# Patient Record
Sex: MALE | URBAN - METROPOLITAN AREA
[De-identification: names, ages, dates, MRNs, and addresses within clinical notes are randomized per-mention and may not be internally consistent; named-entity substitution may affect disease eponyms.]

---

## 2023-01-16 ENCOUNTER — APPOINTMENT (OUTPATIENT)
Dept: URBAN - METROPOLITAN AREA CLINIC 247 | Age: 58
Setting detail: DERMATOLOGY
End: 2023-01-16

## 2023-01-16 DIAGNOSIS — D18.0 HEMANGIOMA: ICD-10-CM

## 2023-01-16 DIAGNOSIS — B07.8 OTHER VIRAL WARTS: ICD-10-CM

## 2023-01-16 DIAGNOSIS — L57.8 OTHER SKIN CHANGES DUE TO CHRONIC EXPOSURE TO NONIONIZING RADIATION: ICD-10-CM

## 2023-01-16 DIAGNOSIS — L91.8 OTHER HYPERTROPHIC DISORDERS OF THE SKIN: ICD-10-CM

## 2023-01-16 DIAGNOSIS — L82.1 OTHER SEBORRHEIC KERATOSIS: ICD-10-CM

## 2023-01-16 DIAGNOSIS — D22 MELANOCYTIC NEVI: ICD-10-CM

## 2023-01-16 PROBLEM — D18.01 HEMANGIOMA OF SKIN AND SUBCUTANEOUS TISSUE: Status: ACTIVE | Noted: 2023-01-16

## 2023-01-16 PROBLEM — D23.71 OTHER BENIGN NEOPLASM OF SKIN OF RIGHT LOWER LIMB, INCLUDING HIP: Status: ACTIVE | Noted: 2023-01-16

## 2023-01-16 PROBLEM — D22.61 MELANOCYTIC NEVI OF RIGHT UPPER LIMB, INCLUDING SHOULDER: Status: ACTIVE | Noted: 2023-01-16

## 2023-01-16 PROCEDURE — OTHER LIQUID NITROGEN: OTHER

## 2023-01-16 PROCEDURE — 17110 DESTRUCT B9 LESION 1-14: CPT

## 2023-01-16 PROCEDURE — 99203 OFFICE O/P NEW LOW 30 MIN: CPT | Mod: 25

## 2023-01-16 PROCEDURE — OTHER COUNSELING: OTHER

## 2023-01-16 PROCEDURE — OTHER MIPS QUALITY: OTHER

## 2023-01-16 ASSESSMENT — LOCATION SIMPLE DESCRIPTION DERM
LOCATION SIMPLE: CHEST
LOCATION SIMPLE: LEFT CHEEK
LOCATION SIMPLE: LEFT ANTERIOR NECK
LOCATION SIMPLE: RIGHT MIDDLE FINGER
LOCATION SIMPLE: LEFT FOREHEAD
LOCATION SIMPLE: RIGHT UPPER ARM
LOCATION SIMPLE: LEFT UPPER BACK

## 2023-01-16 ASSESSMENT — LOCATION DETAILED DESCRIPTION DERM
LOCATION DETAILED: LEFT INFERIOR CENTRAL MALAR CHEEK
LOCATION DETAILED: RIGHT PROXIMAL POSTERIOR UPPER ARM
LOCATION DETAILED: RIGHT MEDIAL SUPERIOR CHEST
LOCATION DETAILED: LEFT MEDIAL UPPER BACK
LOCATION DETAILED: LEFT FOREHEAD
LOCATION DETAILED: LEFT MEDIAL INFERIOR CHEST
LOCATION DETAILED: LEFT SUPERIOR MEDIAL UPPER BACK
LOCATION DETAILED: LEFT INFERIOR MEDIAL MALAR CHEEK
LOCATION DETAILED: LEFT INFERIOR ANTERIOR NECK
LOCATION DETAILED: UPPER STERNUM
LOCATION DETAILED: RIGHT PROXIMAL DORSAL MIDDLE FINGER

## 2023-01-16 ASSESSMENT — LOCATION ZONE DERM
LOCATION ZONE: FACE
LOCATION ZONE: ARM
LOCATION ZONE: FINGER
LOCATION ZONE: TRUNK
LOCATION ZONE: NECK

## 2023-01-16 NOTE — PROCEDURE: LIQUID NITROGEN
Show Applicator Variable?: Yes
Post-Care Instructions: I reviewed with the patient in detail post-care instructions. Patient is to wear sunprotection, and avoid picking at any of the treated lesions. Pt may apply Vaseline to crusted or scabbing areas.
Medical Necessity Clause: This procedure was medically necessary because the lesions that were treated were:
Render Note In Bullet Format When Appropriate: No
Number Of Freeze-Thaw Cycles: 2 freeze-thaw cycles
Duration Of Freeze Thaw-Cycle (Seconds): 5-10
Medical Necessity Information: It is in your best interest to select a reason for this procedure from the list below. All of these items fulfill various CMS LCD requirements except the new and changing color options.
Application Tool (Optional): Cry-AC
Consent: The patient's consent was obtained including but not limited to risks of crusting, scabbing, blistering, scarring, darker or lighter pigmentary change, recurrence, incomplete removal and infection.
Spray Paint Text: The liquid nitrogen was applied to the skin utilizing a spray paint frosting technique.
Detail Level: Detailed

## 2023-07-05 ENCOUNTER — APPOINTMENT (OUTPATIENT)
Dept: URBAN - METROPOLITAN AREA CLINIC 247 | Age: 58
Setting detail: DERMATOLOGY
End: 2023-07-06

## 2023-07-05 DIAGNOSIS — L72.8 OTHER FOLLICULAR CYSTS OF THE SKIN AND SUBCUTANEOUS TISSUE: ICD-10-CM

## 2023-07-05 DIAGNOSIS — L82.0 INFLAMED SEBORRHEIC KERATOSIS: ICD-10-CM

## 2023-07-05 DIAGNOSIS — M71.3 OTHER BURSAL CYST: ICD-10-CM

## 2023-07-05 PROBLEM — D48.5 NEOPLASM OF UNCERTAIN BEHAVIOR OF SKIN: Status: ACTIVE | Noted: 2023-07-05

## 2023-07-05 PROCEDURE — 99212 OFFICE O/P EST SF 10 MIN: CPT | Mod: 25

## 2023-07-05 PROCEDURE — OTHER COUNSELING: OTHER

## 2023-07-05 PROCEDURE — 17110 DESTRUCT B9 LESION 1-14: CPT

## 2023-07-05 PROCEDURE — OTHER ADDITIONAL NOTES: OTHER

## 2023-07-05 PROCEDURE — OTHER LIQUID NITROGEN: OTHER

## 2023-07-05 PROCEDURE — OTHER ORDER ULTRASOUND: OTHER

## 2023-07-05 ASSESSMENT — LOCATION DETAILED DESCRIPTION DERM
LOCATION DETAILED: LEFT MEDIAL SUPERIOR CHEST
LOCATION DETAILED: LEFT CLAVICULAR NECK
LOCATION DETAILED: LEFT INFERIOR ANTERIOR NECK
LOCATION DETAILED: RIGHT CLAVICULAR NECK

## 2023-07-05 ASSESSMENT — LOCATION SIMPLE DESCRIPTION DERM
LOCATION SIMPLE: LEFT ANTERIOR NECK
LOCATION SIMPLE: CHEST
LOCATION SIMPLE: RIGHT ANTERIOR NECK

## 2023-07-05 ASSESSMENT — LOCATION ZONE DERM
LOCATION ZONE: TRUNK
LOCATION ZONE: NECK

## 2023-07-05 NOTE — PROCEDURE: LIQUID NITROGEN
Include Z78.9 (Other Specified Conditions Influencing Health Status) As An Associated Diagnosis?: No
Consent: The patient's consent was obtained including but not limited to risks of crusting, scabbing, blistering, scarring, darker or lighter pigmentary change, recurrence, incomplete removal and infection.
Medical Necessity Information: It is in your best interest to select a reason for this procedure from the list below. All of these items fulfill various CMS LCD requirements except the new and changing color options.
Spray Paint Text: The liquid nitrogen was applied to the skin utilizing a spray paint frosting technique.
Medical Necessity Clause: This procedure was medically necessary because the lesions that were treated were:
Show Aperture Variable?: Yes
Detail Level: Detailed
Post-Care Instructions: I reviewed with the patient in detail post-care instructions. Patient is to wear sunprotection, and avoid picking at any of the treated lesions. Pt may apply Vaseline to crusted or scabbing areas.

## 2023-07-05 NOTE — PROCEDURE: ORDER ULTRASOUND
Ultrasound Protocol: Ultrasound of Skin Lesion
Detail Level: Simple
Provider: Alba San MD
Skin Lesion Ultrasound Reason: Evaluate for cyst vs lipoma vs bursitis
Priority: normal
Lesion Location: right elbow

## 2023-07-05 NOTE — PROCEDURE: ADDITIONAL NOTES
Additional Notes: No h/o arthritis or pain\\nNo prior ocurrence\\n3.5cm.\\nPt advised to call us to inform us where he is got his ultrasound done at if he does hear from us a few days after his testing as we will call the facility to follow up and obtain results
Detail Level: Simple
Render Risk Assessment In Note?: no

## 2024-02-19 ENCOUNTER — APPOINTMENT (OUTPATIENT)
Dept: URBAN - METROPOLITAN AREA CLINIC 247 | Age: 59
Setting detail: DERMATOLOGY
End: 2024-02-19

## 2024-02-19 DIAGNOSIS — D49.2 NEOPLASM OF UNSPECIFIED BEHAVIOR OF BONE, SOFT TISSUE, AND SKIN: ICD-10-CM

## 2024-02-19 DIAGNOSIS — D18.0 HEMANGIOMA: ICD-10-CM

## 2024-02-19 DIAGNOSIS — L91.8 OTHER HYPERTROPHIC DISORDERS OF THE SKIN: ICD-10-CM

## 2024-02-19 DIAGNOSIS — L57.8 OTHER SKIN CHANGES DUE TO CHRONIC EXPOSURE TO NONIONIZING RADIATION: ICD-10-CM

## 2024-02-19 DIAGNOSIS — D22 MELANOCYTIC NEVI: ICD-10-CM

## 2024-02-19 DIAGNOSIS — L82.0 INFLAMED SEBORRHEIC KERATOSIS: ICD-10-CM

## 2024-02-19 DIAGNOSIS — B07.8 OTHER VIRAL WARTS: ICD-10-CM

## 2024-02-19 DIAGNOSIS — L82.1 OTHER SEBORRHEIC KERATOSIS: ICD-10-CM

## 2024-02-19 PROBLEM — D18.01 HEMANGIOMA OF SKIN AND SUBCUTANEOUS TISSUE: Status: ACTIVE | Noted: 2024-02-19

## 2024-02-19 PROBLEM — D23.71 OTHER BENIGN NEOPLASM OF SKIN OF RIGHT LOWER LIMB, INCLUDING HIP: Status: ACTIVE | Noted: 2024-02-19

## 2024-02-19 PROBLEM — D22.61 MELANOCYTIC NEVI OF RIGHT UPPER LIMB, INCLUDING SHOULDER: Status: ACTIVE | Noted: 2024-02-19

## 2024-02-19 PROCEDURE — 99213 OFFICE O/P EST LOW 20 MIN: CPT | Mod: 25

## 2024-02-19 PROCEDURE — OTHER COUNSELING: OTHER

## 2024-02-19 PROCEDURE — OTHER LIQUID NITROGEN: OTHER

## 2024-02-19 PROCEDURE — 11200 RMVL SKIN TAGS UP TO&INC 15: CPT | Mod: 59

## 2024-02-19 PROCEDURE — OTHER BIOPSY BY SHAVE METHOD: OTHER

## 2024-02-19 PROCEDURE — 69100 BIOPSY OF EXTERNAL EAR: CPT

## 2024-02-19 PROCEDURE — 17110 DESTRUCT B9 LESION 1-14: CPT

## 2024-02-19 PROCEDURE — OTHER SKIN TAG REMOVAL: OTHER

## 2024-02-19 PROCEDURE — OTHER MIPS QUALITY: OTHER

## 2024-02-19 ASSESSMENT — LOCATION SIMPLE DESCRIPTION DERM
LOCATION SIMPLE: LEFT ANTERIOR NECK
LOCATION SIMPLE: LEFT EAR
LOCATION SIMPLE: LEFT CHEEK
LOCATION SIMPLE: LEFT PRETIBIAL REGION
LOCATION SIMPLE: RIGHT KNEE
LOCATION SIMPLE: RIGHT UPPER ARM
LOCATION SIMPLE: RIGHT PRETIBIAL REGION
LOCATION SIMPLE: LEFT UPPER BACK
LOCATION SIMPLE: CHEST
LOCATION SIMPLE: LEFT THIGH
LOCATION SIMPLE: RIGHT AXILLARY VAULT

## 2024-02-19 ASSESSMENT — LOCATION DETAILED DESCRIPTION DERM
LOCATION DETAILED: LEFT INFERIOR ANTERIOR NECK
LOCATION DETAILED: RIGHT AXILLARY VAULT
LOCATION DETAILED: LEFT PROXIMAL PRETIBIAL REGION
LOCATION DETAILED: LEFT ANTERIOR DISTAL THIGH
LOCATION DETAILED: RIGHT MEDIAL KNEE
LOCATION DETAILED: LEFT MEDIAL UPPER BACK
LOCATION DETAILED: RIGHT PROXIMAL POSTERIOR UPPER ARM
LOCATION DETAILED: RIGHT MEDIAL SUPERIOR CHEST
LOCATION DETAILED: LEFT SUPERIOR MEDIAL UPPER BACK
LOCATION DETAILED: RIGHT PROXIMAL PRETIBIAL REGION
LOCATION DETAILED: LEFT CLAVICULAR NECK
LOCATION DETAILED: LEFT CENTRAL MALAR CHEEK
LOCATION DETAILED: LEFT INFERIOR MEDIAL MALAR CHEEK
LOCATION DETAILED: LEFT INFERIOR LATERAL NECK
LOCATION DETAILED: UPPER STERNUM
LOCATION DETAILED: LEFT SUPERIOR POSTERIOR HELIX

## 2024-02-19 ASSESSMENT — LOCATION ZONE DERM
LOCATION ZONE: EAR
LOCATION ZONE: AXILLAE
LOCATION ZONE: ARM
LOCATION ZONE: TRUNK
LOCATION ZONE: LEG
LOCATION ZONE: NECK
LOCATION ZONE: FACE

## 2024-02-19 NOTE — PROCEDURE: SKIN TAG REMOVAL
Medical Necessity Information: It is in your best interest to select a reason for this procedure from the list below. All of these items fulfill various CMS LCD requirements except the new and changing color options.
Detail Level: Detailed
Add Associated Diagnoses If Applicable When Selecting Medical Necessity: Yes
Consent: Written consent obtained and the risks of skin tag removal was reviewed with the patient including but not limited to bleeding, pigmentary change, infection, pain, and remote possibility of scarring.
Anesthesia Type: 1% lidocaine with epinephrine
Include Z78.9 (Other Specified Conditions Influencing Health Status) As An Associated Diagnosis?: No
Medical Necessity Clause: This procedure was medically necessary because the lesions that were treated were:

## 2024-02-19 NOTE — PROCEDURE: BIOPSY BY SHAVE METHOD
Body Location Override (Optional - Billing Will Still Be Based On Selected Body Map Location If Applicable): left Posterior helix
Detail Level: Detailed
Depth Of Biopsy: dermis
Was A Bandage Applied: Yes
Size Of Lesion In Cm: 0
Biopsy Type: H and E
Biopsy Method: Dermablade
Anesthesia Type: 1% lidocaine with epinephrine
Anesthesia Volume In Cc: 0.5
Hemostasis: Electrocautery
Wound Care: Petrolatum
Dressing: bandage
Destruction After The Procedure: No
Type Of Destruction Used: Curettage
Curettage Text: The wound bed was treated with curettage after the biopsy was performed.
Cryotherapy Text: The wound bed was treated with cryotherapy after the biopsy was performed.
Electrodesiccation Text: The wound bed was treated with electrodesiccation after the biopsy was performed.
Electrodesiccation And Curettage Text: The wound bed was treated with electrodesiccation and curettage after the biopsy was performed.
Silver Nitrate Text: The wound bed was treated with silver nitrate after the biopsy was performed.
Lab: -[id number]
Consent: Written consent was obtained and risks were reviewed including but not limited to scarring, infection, bleeding, scabbing, incomplete removal, nerve damage and allergy to anesthesia.
Post-Care Instructions: I reviewed with the patient in detail post-care instructions. Patient is to keep the biopsy site dry overnight, and then apply bacitracin twice daily until healed. Patient may apply hydrogen peroxide soaks to remove any crusting.
Notification Instructions: Patient will be notified of biopsy results. However, patient instructed to call the office if not contacted within 2 weeks.
Billing Type: Third-Party Bill
Information: Selecting Yes will display possible errors in your note based on the variables you have selected. This validation is only offered as a suggestion for you. PLEASE NOTE THAT THE VALIDATION TEXT WILL BE REMOVED WHEN YOU FINALIZE YOUR NOTE. IF YOU WANT TO FAX A PRELIMINARY NOTE YOU WILL NEED TO TOGGLE THIS TO 'NO' IF YOU DO NOT WANT IT IN YOUR FAXED NOTE.

## 2024-02-19 NOTE — PROCEDURE: LIQUID NITROGEN
Render Note In Bullet Format When Appropriate: No
Medical Necessity Clause: This procedure was medically necessary because the lesions that were treated were:
Spray Paint Text: The liquid nitrogen was applied to the skin utilizing a spray paint frosting technique.
Medical Necessity Information: It is in your best interest to select a reason for this procedure from the list below. All of these items fulfill various CMS LCD requirements except the new and changing color options.
Consent: The patient's consent was obtained including but not limited to risks of crusting, scabbing, blistering, scarring, darker or lighter pigmentary change, recurrence, incomplete removal and infection.
Show Topical Anesthesia Variable?: Yes
Post-Care Instructions: I reviewed with the patient in detail post-care instructions. Patient is to wear sunprotection, and avoid picking at any of the treated lesions. Pt may apply Vaseline to crusted or scabbing areas.
Detail Level: Detailed

## 2024-03-02 ENCOUNTER — OFFICE VISIT (OUTPATIENT)
Dept: FAMILY MEDICINE CLINIC | Facility: CLINIC | Age: 59
End: 2024-03-02
Payer: COMMERCIAL

## 2024-03-02 VITALS
TEMPERATURE: 97 F | SYSTOLIC BLOOD PRESSURE: 126 MMHG | HEART RATE: 85 BPM | DIASTOLIC BLOOD PRESSURE: 80 MMHG | HEIGHT: 72 IN | BODY MASS INDEX: 36.16 KG/M2 | RESPIRATION RATE: 20 BRPM | OXYGEN SATURATION: 97 % | WEIGHT: 267 LBS

## 2024-03-02 DIAGNOSIS — R11.2 NAUSEA VOMITING AND DIARRHEA: Primary | ICD-10-CM

## 2024-03-02 DIAGNOSIS — R19.7 NAUSEA VOMITING AND DIARRHEA: Primary | ICD-10-CM

## 2024-03-02 DIAGNOSIS — T14.8XXA MUSCLE STRAIN: ICD-10-CM

## 2024-03-02 PROCEDURE — 3074F SYST BP LT 130 MM HG: CPT | Performed by: NURSE PRACTITIONER

## 2024-03-02 PROCEDURE — 3008F BODY MASS INDEX DOCD: CPT | Performed by: NURSE PRACTITIONER

## 2024-03-02 PROCEDURE — 3079F DIAST BP 80-89 MM HG: CPT | Performed by: NURSE PRACTITIONER

## 2024-03-02 PROCEDURE — 99202 OFFICE O/P NEW SF 15 MIN: CPT | Performed by: NURSE PRACTITIONER

## 2024-03-02 RX ORDER — USTEKINUMAB 90 MG/ML
INJECTION, SOLUTION SUBCUTANEOUS
COMMUNITY
Start: 2021-06-28

## 2024-03-02 RX ORDER — MONTELUKAST SODIUM 4 MG/1
3 TABLET, CHEWABLE ORAL 2 TIMES DAILY
COMMUNITY
Start: 2021-04-26

## 2024-03-02 RX ORDER — SEMAGLUTIDE 1.34 MG/ML
1 INJECTION, SOLUTION SUBCUTANEOUS
COMMUNITY

## 2024-03-02 RX ORDER — OMEPRAZOLE 40 MG/1
40 CAPSULE, DELAYED RELEASE ORAL DAILY
COMMUNITY
Start: 2021-06-02

## 2024-03-02 RX ORDER — ALFUZOSIN HYDROCHLORIDE 10 MG/1
10 TABLET, EXTENDED RELEASE ORAL DAILY
COMMUNITY
Start: 2024-02-23

## 2024-03-02 RX ORDER — ROSUVASTATIN CALCIUM 5 MG/1
5 TABLET, COATED ORAL DAILY
COMMUNITY

## 2024-03-02 RX ORDER — AZATHIOPRINE 50 MG/1
3.5 TABLET ORAL DAILY
COMMUNITY
Start: 2021-07-12

## 2024-03-02 NOTE — PROGRESS NOTES
CHIEF COMPLAINT:     Chief Complaint   Patient presents with    Diarrhea     Nausea, diarrhea, vomiting, x3 days        HPI:   Nick Acevedo is a 59 year old male who presents for complaints of nausea, vomiting and diarrhea x 2 days, feels better today. Reports was vomiting fluid and food. No vomiting, diarrhea or nausea today. Reports some pain on mid abdomen. Gets relief with rest.  Associated symptoms: fatigue. Denies fever, chills, malaise,  body aches.  Appetite is good.  Last successful oral intake today.  Denies moderate to severe abdominal pain; denies dizziness. Reports vomited a few times. Vomitus is comprised mainly of food and water.   + brown loose stools     no blood or mucus in stool or vomit.  History of GI disease/abd pain: crohns      no new medications; no change in diet.   Recent travel: no  Sick contacts: no  Suspicious food: no.      Wt Readings from Last 6 Encounters:   03/02/24 267 lb (121.1 kg)     Body mass index is 36.21 kg/m².     Current Outpatient Medications   Medication Sig Dispense Refill    alfuzosin ER 10 MG Oral Tablet 24 Hr Take 1 tablet (10 mg total) by mouth daily.      azaTHIOprine 50 MG Oral Tab Take 3.5 tablets (175 mg total) by mouth daily.      Omeprazole 40 MG Oral Capsule Delayed Release Take 1 capsule (40 mg total) by mouth daily.      STELARA 90 MG/ML Subcutaneous Solution Prefilled Syringe injection INJECT 90 MG UNDER THE SKIN EVERY 4 WEEKS (DOSE INCREASE)      rosuvastatin 5 MG Oral Tab Take 1 tablet (5 mg total) by mouth daily.      OZEMPIC, 1 MG/DOSE, 4 MG/3ML Subcutaneous Solution Pen-injector Inject 1 mg into the skin every 7 days.      colestipol 1 g Oral Tab Take 3 tablets (3 g total) by mouth 2 (two) times daily.        No past medical history on file.   Social History:  Social History     Socioeconomic History    Marital status: Unknown        REVIEW OF SYSTEMS:   GENERAL HEALTH: See HPI  SKIN: denies any unusual skin lesions or rashes  HEENT: denies ear  pain, congestion, or sore throat  CARDIOVASCULAR: denies chest pain or palpitations  RESPIRATORY: denies shortness of breath, cough or wheezing  GI:See HPI  NEURO: denies headaches, loss of bowel or bladder control    EXAM:   /80   Pulse 85   Temp 97.2 °F (36.2 °C) (Temporal)   Resp 20   Ht 6' (1.829 m)   Wt 267 lb (121.1 kg)   SpO2 97%   BMI 36.21 kg/m²   GENERAL: well developed, well nourished,in no apparent distress  SKIN: no rashes,no suspicious lesions, +reproducible tenderness to middle chest wall  HEAD: atraumatic, normocephalic,   EYES: conjunctiva clear  EARS: TM's clear, no bulging, erythema or fluid bilaterally  NOSE: nostrils patent. Nasal mucosa pink and without exudates.   THROAT: oral mucosa pink, moist. Posterior pharynx is not erythematous. No exudates.  NECK: supple,no adenopathy  LUNGS: clear to auscultation bilaterally. No wheezing, rales, or rhonchi.    CARDIO: RRR without murmur  GI: BS's present x4.  No tenderness upon palpation.  No rebound tenderness. No guarding.   EXTREMITIES: no cyanosis, clubbing or edema    ASSESSMENT AND PLAN:   ASSESSMENT:  Encounter Diagnoses   Name Primary?    Nausea vomiting and diarrhea Yes    Muscle strain      1. Nausea vomiting and diarrhea  Resolved. F/u with crohns doctor. ER if worsening symptoms.     2. Muscle strain  Chest wall muscle strain likely from vomiting. Warm compresses. Tylenol prn.       PLAN: Proper diet discussed.  Instructions as listed below.  Advised to go to the Emergency Room if any worsening of condition, persistent vomiting or diarrhea, any blood in stool or vomit, if any fever, abdominal or flank pain develops, difficulty urinating, or new concerning sign or symptom.  The patient is asked to see PCP if symptoms worsen or if no improvement in 2-3 days.    Requested Prescriptions      No prescriptions requested or ordered in this encounter       See pt handout    The patient indicates understanding of these issues and agrees  to the plan.

## 2024-03-04 ENCOUNTER — APPOINTMENT (OUTPATIENT)
Dept: CT IMAGING | Age: 59
End: 2024-03-04
Attending: EMERGENCY MEDICINE
Payer: COMMERCIAL

## 2024-03-04 ENCOUNTER — HOSPITAL ENCOUNTER (EMERGENCY)
Age: 59
Discharge: HOME | End: 2024-03-04
Attending: EMERGENCY MEDICINE
Payer: COMMERCIAL

## 2024-03-04 VITALS
TEMPERATURE: 99 F | OXYGEN SATURATION: 98 % | BODY MASS INDEX: 35.89 KG/M2 | SYSTOLIC BLOOD PRESSURE: 118 MMHG | DIASTOLIC BLOOD PRESSURE: 67 MMHG | HEIGHT: 72 IN | WEIGHT: 265 LBS | HEART RATE: 77 BPM | RESPIRATION RATE: 18 BRPM

## 2024-03-04 DIAGNOSIS — R10.9 ABDOMINAL PAIN OF UNKNOWN ETIOLOGY: Primary | ICD-10-CM

## 2024-03-04 DIAGNOSIS — R19.7 NAUSEA VOMITING AND DIARRHEA: ICD-10-CM

## 2024-03-04 DIAGNOSIS — R11.2 NAUSEA VOMITING AND DIARRHEA: ICD-10-CM

## 2024-03-04 LAB
BASOPHILS # BLD AUTO: 0.02 X10(3) UL (ref 0–0.2)
BASOPHILS NFR BLD AUTO: 0.2 %
BUN BLD-MCNC: 18 MG/DL (ref 7–18)
CHLORIDE BLD-SCNC: 104 MMOL/L (ref 98–112)
CLARITY UR REFRACT.AUTO: CLEAR
CO2 BLD-SCNC: 24 MMOL/L (ref 21–32)
COLOR UR AUTO: YELLOW
CREAT BLD-MCNC: 1 MG/DL
EGFRCR SERPLBLD CKD-EPI 2021: 88 ML/MIN/1.73M2 (ref 60–?)
EOSINOPHIL # BLD AUTO: 0.17 X10(3) UL (ref 0–0.7)
EOSINOPHIL NFR BLD AUTO: 1.7 %
ERYTHROCYTE [DISTWIDTH] IN BLOOD BY AUTOMATED COUNT: 16.1 %
GLUCOSE BLD-MCNC: 115 MG/DL (ref 70–99)
GLUCOSE UR STRIP.AUTO-MCNC: NEGATIVE MG/DL
HCT VFR BLD AUTO: 41.5 %
HCT VFR BLD CALC: 40 %
HGB BLD-MCNC: 13.1 G/DL
IMM GRANULOCYTES # BLD AUTO: 0.03 X10(3) UL (ref 0–1)
IMM GRANULOCYTES NFR BLD: 0.3 %
ISTAT IONIZED CALCIUM FOR CHEM 8: 1.18 MMOL/L (ref 1.12–1.32)
LEUKOCYTE ESTERASE UR QL STRIP.AUTO: NEGATIVE
LYMPHOCYTES # BLD AUTO: 1 X10(3) UL (ref 1–4)
LYMPHOCYTES NFR BLD AUTO: 10.1 %
MCH RBC QN AUTO: 25.8 PG (ref 26–34)
MCHC RBC AUTO-ENTMCNC: 31.6 G/DL (ref 31–37)
MCV RBC AUTO: 81.7 FL
MONOCYTES # BLD AUTO: 0.81 X10(3) UL (ref 0.1–1)
MONOCYTES NFR BLD AUTO: 8.2 %
NEUTROPHILS # BLD AUTO: 7.83 X10 (3) UL (ref 1.5–7.7)
NEUTROPHILS # BLD AUTO: 7.83 X10(3) UL (ref 1.5–7.7)
NEUTROPHILS NFR BLD AUTO: 79.5 %
NITRITE UR QL STRIP.AUTO: NEGATIVE
PH UR STRIP.AUTO: 5.5 [PH] (ref 5–8)
PLATELET # BLD AUTO: 312 10(3)UL (ref 150–450)
POTASSIUM BLD-SCNC: 3.4 MMOL/L (ref 3.6–5.1)
RBC # BLD AUTO: 5.08 X10(6)UL
RBC UR QL AUTO: NEGATIVE
SODIUM BLD-SCNC: 143 MMOL/L (ref 136–145)
SP GR UR STRIP.AUTO: >=1.03 (ref 1–1.03)
UROBILINOGEN UR STRIP.AUTO-MCNC: 0.2 MG/DL
WBC # BLD AUTO: 9.9 X10(3) UL (ref 4–11)

## 2024-03-04 PROCEDURE — 96361 HYDRATE IV INFUSION ADD-ON: CPT

## 2024-03-04 PROCEDURE — 96375 TX/PRO/DX INJ NEW DRUG ADDON: CPT

## 2024-03-04 PROCEDURE — 81015 MICROSCOPIC EXAM OF URINE: CPT | Performed by: EMERGENCY MEDICINE

## 2024-03-04 PROCEDURE — 83690 ASSAY OF LIPASE: CPT | Performed by: EMERGENCY MEDICINE

## 2024-03-04 PROCEDURE — 99285 EMERGENCY DEPT VISIT HI MDM: CPT

## 2024-03-04 PROCEDURE — 85025 COMPLETE CBC W/AUTO DIFF WBC: CPT | Performed by: EMERGENCY MEDICINE

## 2024-03-04 PROCEDURE — 80047 BASIC METABLC PNL IONIZED CA: CPT

## 2024-03-04 PROCEDURE — 96374 THER/PROPH/DIAG INJ IV PUSH: CPT

## 2024-03-04 PROCEDURE — 81001 URINALYSIS AUTO W/SCOPE: CPT | Performed by: EMERGENCY MEDICINE

## 2024-03-04 PROCEDURE — 74177 CT ABD & PELVIS W/CONTRAST: CPT | Performed by: EMERGENCY MEDICINE

## 2024-03-04 PROCEDURE — 99284 EMERGENCY DEPT VISIT MOD MDM: CPT

## 2024-03-04 PROCEDURE — 80053 COMPREHEN METABOLIC PANEL: CPT | Performed by: EMERGENCY MEDICINE

## 2024-03-04 RX ORDER — LOPERAMIDE HYDROCHLORIDE 2 MG/1
2 TABLET ORAL AS NEEDED
Qty: 20 TABLET | Refills: 0 | Status: SHIPPED | OUTPATIENT
Start: 2024-03-04 | End: 2024-04-03

## 2024-03-04 RX ORDER — DICYCLOMINE HCL 20 MG
20 TABLET ORAL 4 TIMES DAILY PRN
Qty: 30 TABLET | Refills: 0 | Status: SHIPPED | OUTPATIENT
Start: 2024-03-04 | End: 2024-04-03

## 2024-03-04 RX ORDER — ONDANSETRON 2 MG/ML
4 INJECTION INTRAMUSCULAR; INTRAVENOUS ONCE
Status: COMPLETED | OUTPATIENT
Start: 2024-03-04 | End: 2024-03-04

## 2024-03-04 RX ORDER — ONDANSETRON 4 MG/1
4 TABLET, ORALLY DISINTEGRATING ORAL EVERY 4 HOURS PRN
Qty: 10 TABLET | Refills: 0 | Status: SHIPPED | OUTPATIENT
Start: 2024-03-04 | End: 2024-03-11

## 2024-03-04 RX ORDER — LOPERAMIDE HYDROCHLORIDE 2 MG/1
2 TABLET ORAL AS NEEDED
Qty: 20 TABLET | Refills: 0 | Status: SHIPPED | OUTPATIENT
Start: 2024-03-04 | End: 2024-03-04

## 2024-03-04 RX ORDER — KETOROLAC TROMETHAMINE 15 MG/ML
15 INJECTION, SOLUTION INTRAMUSCULAR; INTRAVENOUS ONCE
Status: COMPLETED | OUTPATIENT
Start: 2024-03-04 | End: 2024-03-04

## 2024-03-04 RX ORDER — DICYCLOMINE HCL 20 MG
20 TABLET ORAL 4 TIMES DAILY PRN
Qty: 30 TABLET | Refills: 0 | Status: SHIPPED | OUTPATIENT
Start: 2024-03-04 | End: 2024-03-04

## 2024-03-04 RX ORDER — ONDANSETRON 4 MG/1
4 TABLET, ORALLY DISINTEGRATING ORAL EVERY 4 HOURS PRN
Qty: 10 TABLET | Refills: 0 | Status: SHIPPED | OUTPATIENT
Start: 2024-03-04 | End: 2024-03-04

## 2024-03-05 LAB
ALBUMIN SERPL-MCNC: 3.4 G/DL (ref 3.4–5)
ALBUMIN/GLOB SERPL: 0.9 {RATIO} (ref 1–2)
ALP LIVER SERPL-CCNC: 88 U/L
ALT SERPL-CCNC: 18 U/L
ANION GAP SERPL CALC-SCNC: 8 MMOL/L (ref 0–18)
AST SERPL-CCNC: 14 U/L (ref 15–37)
BILIRUB SERPL-MCNC: 1 MG/DL (ref 0.1–2)
BUN BLD-MCNC: 18 MG/DL (ref 9–23)
CALCIUM BLD-MCNC: 9.2 MG/DL (ref 8.5–10.1)
CHLORIDE SERPL-SCNC: 108 MMOL/L (ref 98–112)
CO2 SERPL-SCNC: 24 MMOL/L (ref 21–32)
CREAT BLD-MCNC: 0.93 MG/DL
EGFRCR SERPLBLD CKD-EPI 2021: 96 ML/MIN/1.73M2 (ref 60–?)
GLOBULIN PLAS-MCNC: 3.9 G/DL (ref 2.8–4.4)
GLUCOSE BLD-MCNC: 113 MG/DL (ref 70–99)
LIPASE SERPL-CCNC: 21 U/L (ref 13–75)
OSMOLALITY SERPL CALC.SUM OF ELEC: 293 MOSM/KG (ref 275–295)
POTASSIUM SERPL-SCNC: 3.4 MMOL/L (ref 3.5–5.1)
PROT SERPL-MCNC: 7.3 G/DL (ref 6.4–8.2)
SODIUM SERPL-SCNC: 140 MMOL/L (ref 136–145)

## 2024-03-05 NOTE — ED PROVIDER NOTES
Patient Seen in: [facility] In [place]      History     Chief Complaint   Patient presents with    Abdomen/Flank Pain    Nausea/Vomiting/Diarrhea     Stated Complaint: abdominal pain, N/V/D since Thursday    Subjective:   HPI    59-year-old male with a history of Crohn's disease presents with intermittent abdominal pain along with nausea, vomiting and diarrhea initially starting 2 weeks ago.  Patient states he had vomiting and diarrhea and abdominal pain that lasted approximately 2 days and then he was asymptomatic up until 4 days ago.  He states that he again had nausea and diarrhea which lasted 2 days but felt better over the weekend.  Patient states he had recurrence of symptoms today with dry heaves and 2-3 episodes of watery/nonbloody diarrhea.  Reports abdominal pain that is diffuse but worse in his lower abdomen.  Denies fever or chills.  Denies urinary symptoms.  Denies cough or cold symptoms.    Objective:   Past Medical History:   Diagnosis Date    Crohn disease (HCC)               History reviewed. No pertinent surgical history.             Social History     Socioeconomic History    Marital status:    Tobacco Use    Smoking status: Never    Smokeless tobacco: Never              Review of Systems    Positive for stated complaint: abdominal pain, N/V/D since Thursday  Other systems are as noted in HPI.  Constitutional and vital signs reviewed.      All other systems reviewed and negative except as noted above.    Physical Exam     ED Triage Vitals [03/04/24 1829]   /83   Pulse 90   Resp 18   Temp 98.8 °F (37.1 °C)   Temp src Temporal   SpO2 95 %   O2 Device        Current:/83   Pulse 90   Temp 98.8 °F (37.1 °C) (Temporal)   Resp 18   Ht 182.9 cm (6')   Wt 120.2 kg   SpO2 95%   BMI 35.94 kg/m²         Physical Exam  Vitals and nursing note reviewed.   Constitutional:       General: He is not in acute distress.     Appearance: He is well-developed. He is not  ill-appearing.   HENT:      Head: Normocephalic and atraumatic.      Mouth/Throat:      Mouth: Mucous membranes are moist.   Eyes:      General: No scleral icterus.     Extraocular Movements: Extraocular movements intact.   Cardiovascular:      Rate and Rhythm: Normal rate and regular rhythm.   Pulmonary:      Effort: Pulmonary effort is normal.      Breath sounds: Normal breath sounds.   Abdominal:      General: Bowel sounds are normal. There is no distension.      Palpations: Abdomen is soft.      Tenderness: There is abdominal tenderness. There is no guarding or rebound.      Comments: Diffuse tenderness greatest in the left lower quadrant   Musculoskeletal:      Cervical back: Neck supple.   Skin:     General: Skin is warm and dry.      Capillary Refill: Capillary refill takes less than 2 seconds.   Neurological:      Mental Status: He is alert and oriented to person, place, and time.      GCS: GCS eye subscore is 4. GCS verbal subscore is 5. GCS motor subscore is 6.   Psychiatric:         Mood and Affect: Mood normal.         Behavior: Behavior normal.               ED Course     Labs Reviewed   URINALYSIS, ROUTINE - Abnormal; Notable for the following components:       Result Value    Bilirubin Urine Small (*)     Ketones Urine Trace (*)     Protein Urine 30 mg/dL (*)     All other components within normal limits   POCT ISTAT CHEM8 CARTRIDGE - Abnormal; Notable for the following components:    ISTAT Potassium 3.4 (*)     ISTAT Glucose 115 (*)     All other components within normal limits   CBC W/ DIFFERENTIAL - Abnormal; Notable for the following components:    MCH 25.8 (*)     Neutrophil Absolute Prelim 7.83 (*)     Neutrophil Absolute 7.83 (*)     All other components within normal limits   UA MICROSCOPIC ONLY, URINE - Normal   CBC WITH DIFFERENTIAL WITH PLATELET    Narrative:     The following orders were created for panel order CBC With Differential With Platelet.  Procedure                                Abnormality         Status                     ---------                               -----------         ------                     CBC W/ DIFFERENTIAL[[id number]]          Abnormal            Final result                 Please view results for these tests on the individual orders.   COMP METABOLIC PANEL (14)   LIPASE             CT ABDOMEN+PELVIS(CONTRAST ONLY)(CPT=74177)    Result Date: 3/4/2024  CONCLUSION:  1. No acute abnormality in the abdomen or pelvis.  Please see above for further details.   LOCATION:  [facility]   Dictated by (CST): Osmar Charles MD on 3/04/2024 at 9:07 PM     Finalized by (CST): Osmar Charles MD on 3/04/2024 at 9:10 PM               MDM   59-year-old male with a history of Crohn's disease presents with intermittent abdominal pain along with nausea, vomiting and diarrhea initially starting 2 weeks ago.      Differential includes but is not limited to Crohn's exacerbation, diverticulitis, colitis, appendicitis, viral syndrome, foodborne illness    Labs are unremarkable with normal WBC, hemoglobin, and renal function.  Due to laboratory instrument malfunction, unable to obtain results of LFTs and lipase at this time.    Independent interpretation of CT abdomen/pelvis shows no evidence of diverticulitis or colitis.  Radiology report reviewed as above noting no acute abnormality.    Patient reassessed states pain is improved after being treated with Toradol, Zofran and IV fluids.      Unclear etiology of patient's abdominal pain, vomiting or diarrhea which may be viral in etiology.  Instructed on symptomatic and supportive care.  Will DC home with Rx for Zofran and Bentyl.  Instructed to follow-up with his GI specialist for further outpatient management.  Return precaution discussed.    Medical Decision Making  Amount and/or Complexity of Data Reviewed  Labs: ordered. Decision-making details documented in ED Course.  Radiology: ordered and independent interpretation performed. Decision-making  details documented in ED Course.    Risk  Prescription drug management.        Disposition and Plan     Clinical Impression:  1. Abdominal pain of unknown etiology    2. Nausea vomiting and diarrhea         Disposition:  Discharge  3/4/2024 10:30 pm    Follow-up:  Riccardo Centeno  [address] [place] [id number] [phone]    Follow up            Medications Prescribed:  Current Discharge Medication List        START taking these medications    Details   ondansetron 4 MG Oral Tablet Dispersible Take 1 tablet (4 mg total) by mouth every 4 (four) hours as needed for Nausea.  Qty: 10 tablet, Refills: 0      dicyclomine 20 MG Oral Tab Take 1 tablet (20 mg total) by mouth 4 (four) times daily as needed.  Qty: 30 tablet, Refills: 0      Loperamide HCl 2 MG Oral Tab Take 1 tablet (2 mg total) by mouth as needed for Diarrhea. Take 2 tablets initially.  Take 1 tablet after each unformed stool.  Max 8 tablets/day.  Qty: 20 tablet, Refills: 0

## 2024-04-05 ENCOUNTER — OFFICE VISIT (OUTPATIENT)
Facility: LOCATION | Age: 59
End: 2024-04-05
Payer: COMMERCIAL

## 2024-04-05 ENCOUNTER — HOSPITAL ENCOUNTER (OUTPATIENT)
Dept: GENERAL RADIOLOGY | Age: 59
Discharge: HOME | End: 2024-04-05
Attending: PODIATRIST
Payer: COMMERCIAL

## 2024-04-05 DIAGNOSIS — M79.671 RIGHT FOOT PAIN: ICD-10-CM

## 2024-04-05 DIAGNOSIS — M21.6X1 ACQUIRED REARFOOT VARUS, RIGHT: ICD-10-CM

## 2024-04-05 DIAGNOSIS — M76.71 PERONEAL TENDINITIS OF RIGHT LOWER EXTREMITY: Primary | ICD-10-CM

## 2024-04-05 DIAGNOSIS — Q66.71 CAVUS DEFORMITY OF RIGHT FOOT: ICD-10-CM

## 2024-04-05 DIAGNOSIS — M89.8X9 BONY PROMINENCE: ICD-10-CM

## 2024-04-05 DIAGNOSIS — Q66.70 HIGH ARCH: ICD-10-CM

## 2024-04-05 PROCEDURE — 99203 OFFICE O/P NEW LOW 30 MIN: CPT | Performed by: PODIATRIST

## 2024-04-05 PROCEDURE — 73630 X-RAY EXAM OF FOOT: CPT | Performed by: PODIATRIST

## 2024-04-05 NOTE — PROGRESS NOTES
[facility]  Progress Note    Nick Acevedo is a 59 year old male.   Chief Complaint   Patient presents with    Foot Pain     Consult Right lateral aspect of foot intermittent sharp pain 1-7/10, it travels to ankle. Onset a month ago no injury. He has seen another podiatry and wants to switch for a closer office.         HPI:     Patient is a pleasant 59-year-old male who is presenting to clinic today with ongoing intermittent pain to the outside of his right foot.  Patient states that he has been dealing with a sharp pain to the outside of his foot that occasionally travels up to the ankle.  He states that this has been going on for about a month.  Denies any injuries.  Rates his pain 0/10 today, but does state the pain can be anywhere from 1-7/10.  Patient has been established with an outside podiatrist but is wanting to switch to be closer to home.  He is denying any other concerns and here today for further evaluation and care.  Denies recent nausea, vomiting, fever, chills.      Allergies: Patient has no known allergies.   Current Outpatient Medications   Medication Sig Dispense Refill    alfuzosin ER 10 MG Oral Tablet 24 Hr Take 1 tablet (10 mg total) by mouth daily.      azaTHIOprine 50 MG Oral Tab Take 3.5 tablets (175 mg total) by mouth daily.      Omeprazole 40 MG Oral Capsule Delayed Release Take 1 capsule (40 mg total) by mouth daily.      STELARA 90 MG/ML Subcutaneous Solution Prefilled Syringe injection INJECT 90 MG UNDER THE SKIN EVERY 4 WEEKS (DOSE INCREASE)      rosuvastatin 5 MG Oral Tab Take 1 tablet (5 mg total) by mouth daily.      OZEMPIC, 1 MG/DOSE, 4 MG/3ML Subcutaneous Solution Pen-injector Inject 1 mg into the skin every 7 days.      colestipol 1 g Oral Tab Take 3 tablets (3 g total) by mouth 2 (two) times daily.        Past Medical History:   Diagnosis Date    Crohn disease (HCC)       No past surgical history on file.   No family history on file.   Social History      Socioeconomic History    Marital status:    Tobacco Use    Smoking status: Never    Smokeless tobacco: Never           REVIEW OF SYSTEMS:     10 point ROS completed and was negative, except for pertinent positive and negatives stated in subjective.       EXAM:     Right lower extremity focused exam:  GENERAL: well developed, well nourished, in no apparent distress  EXTREMITIES:  1. Integument: Skin appears moist, warm, and supple with positive hair growth. There are no color changes. No open lesions. No macerations. No Hyperkeratotic lesions.   2. Vascular: Dorsalis pedis 2/4 bilateral and posterior tibial pulses 2/4 bilateral, capillary refill normal.  3. Neurological: Gross sensation intact via light touch bilaterally.  Normal sharp/dull sensation  4. Musculoskeletal: All muscle groups are graded 5/5 in the foot and ankle.  Patient does have increased medial longitudinal arch noted with a varus deformity to his right foot.  His fifth metatarsal base is prominent with minimal pain with palpation to the base today.  There is no pain proximally along the course of peroneal tendon.  No pain with activation of peroneal tendon.  No concerns of peroneal tendon rupture.     XR FOOT WEIGHTBEARING (3 VIEWS), RIGHT   (CPT=73630)    Result Date: 4/5/2024  CONCLUSION:  No acute osseous findings.  Degenerative changes within the interphalangeal joints.  Calcaneal Achilles enthesophyte.   LOCATION:  [facility]   Dictated by (CST): Osmar Charles MD on 4/05/2024 at 3:17 PM     Finalized by (CST): Osmar Charles MD on 4/05/2024 at 3:18 PM          ASSESSMENT AND PLAN:   Diagnoses and all orders for this visit:    Peroneal tendinitis of right lower extremity    Cavus deformity of right foot    Acquired rearfoot varus, right    High arch    Bony prominence    Right foot pain  -     XR FOOT WEIGHTBEARING (3 VIEWS), RIGHT   (CPT=73630); Future        Plan:   -Patient was seen and evaluated today in clinic.  Chart history  reviewed.    Radiographs were obtained today and independently reviewed.  There are no acute osseous abnormalities, but there are degenerative changes noted to the forefoot, as well as increased height of medial longitudinal arch with a bullet hole sign to sinus tarsi and lateral consistent with cavus deformity    Fairly benign exam today.  Patient subjectively states that he has improved and has no pain today.    Discussed foot deformity and importance of adding support.  Provided information in regards to supportive shoe gear and supportive inserts.  He should avoid barefoot walking.    Provided patient with at home foot and ankle conditioning program for intermittent peroneal tendinitis, at home foot and ankle conditioning program.  Explained that we can look into physical therapy if needed in the future    Patient should rest, ice, and elevate with any increasing pain and contact my office with any questions or concerns    -The patient indicates understanding of these issues and agrees to the plan.    Time spent reviewing pertinent information from patient's chart, reviewing any pertinent imaging, obtaining history and physical exam, discussing and mutually agreeing on a treatment plan, and documenting encounter: 30 minutes    RTC as needed      Alpesh Santos DPM        4/5/2024    Dragon speech recognition software was used to prepare this note.  Errors in word recognition may occur.  Please contact me with any questions/concerns with this note.

## 2024-09-12 ENCOUNTER — TELEPHONE (OUTPATIENT)
Facility: LOCATION | Age: 59
End: 2024-09-12

## 2024-09-12 NOTE — TELEPHONE ENCOUNTER
Per patient sprained his left ankle this past weekend, patient was seen at [facility] emergency room, patient has established care with Dr. Santos, would like appointment sooner than next available on 10/8. Please call thank you.

## 2024-09-13 ENCOUNTER — HOSPITAL ENCOUNTER (OUTPATIENT)
Dept: GENERAL RADIOLOGY | Age: 59
Discharge: HOME | End: 2024-09-13
Attending: PODIATRIST
Payer: COMMERCIAL

## 2024-09-13 ENCOUNTER — OFFICE VISIT (OUTPATIENT)
Facility: LOCATION | Age: 59
End: 2024-09-13
Payer: COMMERCIAL

## 2024-09-13 DIAGNOSIS — M25.472 EDEMA OF LEFT ANKLE: ICD-10-CM

## 2024-09-13 DIAGNOSIS — M79.672 LEFT FOOT PAIN: ICD-10-CM

## 2024-09-13 DIAGNOSIS — M25.572 ACUTE LEFT ANKLE PAIN: ICD-10-CM

## 2024-09-13 DIAGNOSIS — S99.912A INJURY OF LEFT ANKLE, INITIAL ENCOUNTER: ICD-10-CM

## 2024-09-13 DIAGNOSIS — S99.912A INJURY OF LEFT ANKLE, INITIAL ENCOUNTER: Primary | ICD-10-CM

## 2024-09-13 DIAGNOSIS — S93.402A MODERATE LEFT ANKLE SPRAIN, INITIAL ENCOUNTER: ICD-10-CM

## 2024-09-13 PROCEDURE — 73610 X-RAY EXAM OF ANKLE: CPT | Performed by: PODIATRIST

## 2024-09-13 PROCEDURE — 73630 X-RAY EXAM OF FOOT: CPT | Performed by: PODIATRIST

## 2024-09-13 PROCEDURE — 99214 OFFICE O/P EST MOD 30 MIN: CPT | Performed by: PODIATRIST

## 2024-09-13 RX ORDER — TRAMADOL HYDROCHLORIDE 50 MG/1
50 TABLET ORAL EVERY 8 HOURS PRN
Qty: 30 TABLET | Refills: 0 | Status: SHIPPED | OUTPATIENT
Start: 2024-09-13 | End: 2024-09-23

## 2024-09-14 NOTE — PROGRESS NOTES
[facility]  Progress Note    Nick Acevedo is a 59 year old male.   Chief Complaint   Patient presents with    New Patient     Patient was out doing yard work, on his front stoop and missed step on 9/7/24. Patient was taken to [facility], xray, negative. He still has bruising, swelling and pain. Rates pain 6/10, medial and lateral sides of ankle, there is bruising, denies any numbness or tingling.         HPI:     Patient is a pleasant 59-year-old male who is presenting to clinic today with a left ankle injury.  Patient states that he missed a step while doing yard work on 9/7/2024, causing a left ankle inversion injury.  Patient states that he had significant pain following the injury.  He did go to [facility] where radiographs revealed no foot or ankle fractures.  Patient states that earlier this week he tried going to work [employer] and did a little extra walking.  Since then, patient has noticed continued pain, swelling, and bruising.  He is currently ambulating utilizing 1 crutch in normal shoe gear.  Rates his pain 6/10 and he states that he has pain surrounding the ankle.  Denies hearing any pops or cracks during the injury.  No other concerns and here today for further evaluation and care.  Denies recent nausea, vomiting, fevers, chills.      Allergies: Patient has no known allergies.   Current Outpatient Medications   Medication Sig Dispense Refill    traMADol 50 MG Oral Tab Take 1 tablet (50 mg total) by mouth every 8 (eight) hours as needed for Pain. 30 tablet 0    alfuzosin ER 10 MG Oral Tablet 24 Hr Take 1 tablet (10 mg total) by mouth daily.      azaTHIOprine 50 MG Oral Tab Take 3.5 tablets (175 mg total) by mouth daily.      Omeprazole 40 MG Oral Capsule Delayed Release Take 1 capsule (40 mg total) by mouth daily.      STELARA 90 MG/ML Subcutaneous Solution Prefilled Syringe injection INJECT 90 MG UNDER THE SKIN EVERY 4 WEEKS (DOSE INCREASE)      rosuvastatin 5 MG Oral Tab  Take 1 tablet (5 mg total) by mouth daily.      OZEMPIC, 1 MG/DOSE, 4 MG/3ML Subcutaneous Solution Pen-injector Inject 1 mg into the skin every 7 days.      colestipol 1 g Oral Tab Take 3 tablets (3 g total) by mouth 2 (two) times daily.        Past Medical History:    Crohn disease (HCC)      No past surgical history on file.   No family history on file.   Social History     Socioeconomic History    Marital status:    Tobacco Use    Smoking status: Never    Smokeless tobacco: Never           REVIEW OF SYSTEMS:     10 point ROS completed and was negative, except for pertinent positive and negatives stated in subjective.       EXAM:     Left lower extremity focused exam:  GENERAL: well developed, well nourished, in no apparent distress  EXTREMITIES:  1. Integument: Skin appears moist, warm, and supple with positive hair growth.  Some bruising noted to the ankle. No open lesions. No macerations. No Hyperkeratotic lesions.   2. Vascular: Pedal pulses tough to palpate due to significant edema, capillary refill normal.  Edema noted to left lower extremity  3. Neurological: Gross sensation intact via light touch bilaterally.  Normal sharp/dull sensation  4. Musculoskeletal: Palpation to both lateral and medial ankle, particularly overlying ligament complexes.  Patient is able to actively move his toes and slightly move his ankle joint with some restriction.  Compartments of the foot are soft and compressible.  Remainder of exam deferred due to current pain status    XR ANKLE WEIGHTBEARING (3 VIEWS), LEFT (CPT=73610)    Result Date: 9/13/2024  PROCEDURE:  XR ANKLE WEIGHTBEARING (3 VIEWS), LEFT (CPT=73610)  TECHNIQUE:  Three views were obtained.  COMPARISON:  None.  INDICATIONS:  S99.912A Injury of left ankle, initial encounter  PATIENT STATED HISTORY: (As transcribed by Technologist)  Patient here for ortho evaluation. Patient complains of left foot and ankle pain after falling from his steps 1 week ago    Findings:   No fracture or dislocation.  Joint spaces are well maintained.  Dorsal osteophytes involving the talus.  Large dorsal calcaneal osteophyte.  Small bimalleolar osteophytes.  IMPRESSION:  No fracture or dislocation.   LOCATION:  [facility]   Dictated by (CST): Benedicto Parra MD on 9/13/2024 at 6:40 PM     Finalized by (CST): Benedicto Parra MD on 9/13/2024 at 6:40 PM       XR FOOT, COMPLETE (MIN 3 VIEWS), LEFT (CPT=73630)    Result Date: 9/13/2024  PROCEDURE:  XR FOOT, COMPLETE (MIN 3 VIEWS), LEFT (CPT=73630)  TECHNIQUE:  AP, oblique, and lateral views were obtained.  COMPARISON:  None.  INDICATIONS:  M79.672 Left foot pain  PATIENT STATED HISTORY: (As transcribed by Technologist)  Patient here for ortho evaluation. Patient complains of left foot and ankle pain after falling from his steps 1 week ago    FINDINGS: No fracture or dislocation. Joint spaces are well maintained. Large dorsal calcaneal osteophyte.  Mild midfoot osteoarthritic changes.  Dorsal osteophyte at the talus. IMPRESSION: Large dorsal calcaneal osteophyte.  No fracture or dislocation.   LOCATION:  [facility]   Dictated by (CST): Benedicto Parra MD on 9/13/2024 at 6:38 PM     Finalized by (CST): Benedicto Parra MD on 9/13/2024 at 6:39 PM             ASSESSMENT AND PLAN:   Diagnoses and all orders for this visit:    Injury of left ankle, initial encounter  -     XR ANKLE WEIGHTBEARING (3 VIEWS), LEFT (CPT=73610); Future  -     traMADol 50 MG Oral Tab; Take 1 tablet (50 mg total) by mouth every 8 (eight) hours as needed for Pain.    Moderate left ankle sprain, initial encounter  -     traMADol 50 MG Oral Tab; Take 1 tablet (50 mg total) by mouth every 8 (eight) hours as needed for Pain.    Edema of left ankle  -     traMADol 50 MG Oral Tab; Take 1 tablet (50 mg total) by mouth every 8 (eight) hours as needed for Pain.    Left foot pain  -     XR FOOT, COMPLETE (MIN 3 VIEWS), LEFT (CPT=73630); Future  -     traMADol 50 MG Oral Tab; Take 1 tablet  (50 mg total) by mouth every 8 (eight) hours as needed for Pain.    Acute left ankle pain  -     traMADol 50 MG Oral Tab; Take 1 tablet (50 mg total) by mouth every 8 (eight) hours as needed for Pain.        Plan:   -Patient was seen and evaluated today in clinic.  Chart history reviewed.    Radiographs were obtained today.  No acute fractures noted.  See above for impression.  Described in detail the anatomy of the lateral ankle ligaments and how an inversion type ankle sprain can place significant stress on these ligaments  Patient's exam is limited today due to his current pain status.  Will plan for more thorough evaluation of the left ankle at follow-up  Patient was given instruction to stay off the ankle as much as possible  Advised the use of compression stockings or ACE wraps to help decrease swelling/edema.  Applied a compressive Ace bandage to left lower extremity today    Discussed the importance of immobilization.    Discussed conservative management     Discussed surgical management and indications for both.    Will move forward with conservative management.    Recommend cryotherapy/icing, rest, and elevation.    CAM boot dispensed to patient today. Recommend use at all times.  Pt to be WBAT.  Patient can continue to utilize crutches as needed for gait assistance.  Patient continues to have significant pain with protected weightbearing, recommend nonweightbearing utilizing a knee scooter.    Rx: Tramadol 50 mg 1 p.o. every 8 hours    -The patient indicates understanding of these issues and agrees to the plan.    Time spent reviewing pertinent information from patient's chart, reviewing any pertinent imaging, obtaining history and physical exam, discussing and mutually agreeing on a treatment plan, and documenting encounter: 30 minutes    RTC 3 weeks      Alpesh Santos DPM        [facility]  [address] [address] [address]     9/13/2024    Dragon speech recognition  software was used to prepare this note.  Errors in word recognition may occur.  Please contact me with any questions/concerns with this note.

## 2024-09-20 ENCOUNTER — TELEPHONE (OUTPATIENT)
Facility: LOCATION | Age: 59
End: 2024-09-20

## 2024-09-20 NOTE — TELEPHONE ENCOUNTER
Patient called to obtain status on disability forms. Informed patient we have not received any type of forms. Gave patient forms department email/fax. Patient states these are urgent. Informed patient to give us a call when his disability company sent them over to us and we will expedite forms.      Type of Leave:  disability   Reason for Leave: Left ankle pain/sprain  Start date of leave: 9/9/24  End date of leave: pending re eval 10/11/24  How many flare ups per month/length?: n/a  How many appts per month/length?: n/a  Was Fee and Turnaround info Given?: Yes

## 2024-09-23 NOTE — TELEPHONE ENCOUNTER
Patient called - wanted to check status on forms advised no forms received - Patient states will send forms to us via email patient requested for us to stay on phone until we get forms..       Forms received and logged for processing - forms will be expedited for completion- advised patient about JANIA- per patient will complete JANIA but will like forms faxed because he might not be able to complete electronically and taking a trip to [place] office to complete JANIA isn't an option for him- he requested we just fax forms - advised will speak to lead about situation. No guarantee on faxing forms are a courtesy

## 2024-09-23 NOTE — TELEPHONE ENCOUNTER
Dr. Santos,    Please sign off on form if you agree to: Disability due to patient left ankle sprain , 9/9/24-pending re eval 10/11/24    -Signature page will be the first page scanned  -From your Inbasket, Highlight the patient and click Chart   -Double click the 9/20/24 Forms Completion telephone encounter  -Scroll down to the Media section   -Click the blue Hyperlink: Disab Dr Marcano 9/23/24  -Click Acknowledge located in the top right ribbon/menu   -Drag the mouse into the blank space of the document and a + sign will appear. Left click to   electronically sign the document.  -Once signed, simply exit out of the screen and you signature will be saved.     Thank you,    Herlinda GUTIERREZ

## 2024-10-11 ENCOUNTER — OFFICE VISIT (OUTPATIENT)
Facility: LOCATION | Age: 59
End: 2024-10-11
Payer: COMMERCIAL

## 2024-10-11 DIAGNOSIS — S93.402D MODERATE LEFT ANKLE SPRAIN, SUBSEQUENT ENCOUNTER: ICD-10-CM

## 2024-10-11 DIAGNOSIS — Q66.71 CAVUS DEFORMITY OF RIGHT FOOT: ICD-10-CM

## 2024-10-11 DIAGNOSIS — M25.472 EDEMA OF LEFT ANKLE: ICD-10-CM

## 2024-10-11 DIAGNOSIS — M25.572 ACUTE LEFT ANKLE PAIN: ICD-10-CM

## 2024-10-11 DIAGNOSIS — Q66.70 HIGH ARCH: ICD-10-CM

## 2024-10-11 DIAGNOSIS — M76.71 PERONEAL TENDINITIS OF RIGHT LOWER EXTREMITY: ICD-10-CM

## 2024-10-11 DIAGNOSIS — M76.822 POSTERIOR TIBIAL TENDINITIS OF LEFT LEG: ICD-10-CM

## 2024-10-11 DIAGNOSIS — S99.912D INJURY OF LEFT ANKLE, SUBSEQUENT ENCOUNTER: Primary | ICD-10-CM

## 2024-10-11 DIAGNOSIS — M21.6X1 ACQUIRED REARFOOT VARUS, RIGHT: ICD-10-CM

## 2024-10-11 PROCEDURE — 99213 OFFICE O/P EST LOW 20 MIN: CPT | Performed by: PODIATRIST

## 2024-10-11 NOTE — PROGRESS NOTES
[facility]  Progress Note    Nick Acevedo is a 59 year old male.   Chief Complaint   Patient presents with    Ankle Pain     L ankle injury f/u- per pt he stop using cam boot on Monday due to back pain- rates pain 3-4/10 most of the time         HPI:     Patient is a pleasant 59-year-old male who is returning to clinic today for recheck on left ankle sprain.  Patient states he has been doing well overall.  He has been ambulating in a cam boot, which she states that he stopped utilizing on Monday due to starting to have back pain.  Patient still states that he is having pain, rating it 3-4/10 most of the time.  Swelling has improved overall.  Patient continues to rest.  No other concerns at this time is presenting today for further evaluation and care.  Denies recent nausea, vomiting, fevers, chills.      Allergies: Patient has no known allergies.   Current Outpatient Medications   Medication Sig Dispense Refill    Cyanocobalamin 2500 MCG Sublingual SL Tab Place 2,500 mcg under the tongue daily.      Cholecalciferol 50 MCG (2000 UT) Oral Tab Take 50 mcg by mouth daily.      alfuzosin ER 10 MG Oral Tablet 24 Hr Take 1 tablet (10 mg total) by mouth daily.      azaTHIOprine 50 MG Oral Tab Take 3.5 tablets (175 mg total) by mouth daily.      Omeprazole 40 MG Oral Capsule Delayed Release Take 1 capsule (40 mg total) by mouth daily.      STELARA 90 MG/ML Subcutaneous Solution Prefilled Syringe injection INJECT 90 MG UNDER THE SKIN EVERY 4 WEEKS (DOSE INCREASE)      rosuvastatin 5 MG Oral Tab Take 1 tablet (5 mg total) by mouth daily.      OZEMPIC, 1 MG/DOSE, 4 MG/3ML Subcutaneous Solution Pen-injector Inject 1 mg into the skin every 7 days.      colestipol 1 g Oral Tab Take 3 tablets (3 g total) by mouth 2 (two) times daily.        Past Medical History:    Crohn disease (HCC)      No past surgical history on file.   No family history on file.   Social History     Socioeconomic History    Marital status:     Tobacco Use    Smoking status: Never    Smokeless tobacco: Never           REVIEW OF SYSTEMS:     10 point ROS completed and was negative, except for pertinent positive and negatives stated in subjective.       EXAM:     Left lower extremity focused exam:  GENERAL: well developed, well nourished, in no apparent distress  EXTREMITIES:  1. Integument: Skin appears moist, warm, and supple with positive hair growth.  Some bruising noted to the ankle. No open lesions. No macerations. No Hyperkeratotic lesions.   2. Vascular: Pedal pulses tough to palpate due to significant edema, capillary refill normal.  Edema noted to left lower extremity  3. Neurological: Gross sensation intact via light touch bilaterally.  Normal sharp/dull sensation  4. Musculoskeletal: Pain with palpation to both lateral and medial ankle, particularly overlying ligament complexes, which has overall improved.  Patient is able to actively move his toes and slightly move his ankle joint with some restriction.  Negative anterior drawer.  Compartments of the foot are soft and compressible.  4+/5 muscle strength all tendons crossing the ankle joint with some pain elicited     ASSESSMENT AND PLAN:   Diagnoses and all orders for this visit:    Injury of left ankle, subsequent encounter  -     OP REFERRAL TO [facility]    Moderate left ankle sprain, subsequent encounter  -     OP REFERRAL TO [facility]    Peroneal tendinitis of right lower extremity  -     OP REFERRAL TO [facility]    Posterior tibial tendinitis of left leg  -     OP REFERRAL TO [facility]    Cavus deformity of right foot  -     OP REFERRAL TO [facility]    Acquired rearfoot varus, right  -     OP REFERRAL TO [facility]    High arch  -     OP REFERRAL TO [facility]    Edema of left ankle  -     OP REFERRAL TO [facility]    Acute left ankle pain  -     OP REFERRAL TO [facility]        Plan:   -Patient was seen and evaluated today in clinic.  Chart history reviewed.    Patient is clinically improved, with improvements noted with pain and edema    Discussed further treatment recommendations.  I do recommend patient continue to transition out of cam boot into a lace up ankle brace.  Patient was properly fitted for and provided with a lace up ankle brace today.    Recommending formal physical therapy to patient at this time.  He is in agreement and referral was placed today.    I do recommend patient continue to rest, ice, and elevate, avoiding any activities that do elicit pain.    All of patient's questions and concerns were addressed.    -The patient indicates understanding of these issues and agrees to the plan.    Time spent reviewing pertinent information from patient's chart, reviewing any pertinent imaging, obtaining history and physical exam, discussing and mutually agreeing on a treatment plan, and documenting encounter: 25 minutes    RTC 4 to 6 weeks from when starting physical therapy      Alpesh Santos DPM        [facility]  [address] [address] [address]     10/11/2024    Dragon speech recognition software was used to prepare this note.  Errors in word recognition may occur.  Please contact me with any questions/concerns with this note.

## 2025-01-10 ENCOUNTER — OFFICE VISIT (OUTPATIENT)
Facility: LOCATION | Age: 60
End: 2025-01-10
Payer: COMMERCIAL

## 2025-01-10 DIAGNOSIS — M21.6X1 ACQUIRED REARFOOT VARUS, RIGHT: ICD-10-CM

## 2025-01-10 DIAGNOSIS — Q66.70 HIGH ARCH: ICD-10-CM

## 2025-01-10 DIAGNOSIS — Q66.72 CAVUS DEFORMITY OF BOTH FEET: ICD-10-CM

## 2025-01-10 DIAGNOSIS — S99.912D INJURY OF LEFT ANKLE, SUBSEQUENT ENCOUNTER: Primary | ICD-10-CM

## 2025-01-10 DIAGNOSIS — M21.6X2 ACQUIRED LEFT REARFOOT VARUS: ICD-10-CM

## 2025-01-10 DIAGNOSIS — S93.402D MODERATE LEFT ANKLE SPRAIN, SUBSEQUENT ENCOUNTER: ICD-10-CM

## 2025-01-10 DIAGNOSIS — M76.71 PERONEAL TENDINITIS OF RIGHT LOWER EXTREMITY: ICD-10-CM

## 2025-01-10 DIAGNOSIS — M25.572 ACUTE LEFT ANKLE PAIN: ICD-10-CM

## 2025-01-10 DIAGNOSIS — Q66.71 CAVUS DEFORMITY OF BOTH FEET: ICD-10-CM

## 2025-01-10 PROCEDURE — 99213 OFFICE O/P EST LOW 20 MIN: CPT | Performed by: PODIATRIST

## 2025-01-10 NOTE — PROGRESS NOTES
[facility]  Progress Note      Nick Acevedo is a 59 year old male.   Chief Complaint   Patient presents with    Ankle Pain     Patient is here to follow up on left ankle sprain. He states that it is better, it does flare up at times. He states that now his back hurts, he can only walk 2-3 blocks. He rates pain 1/10, denies any numbness or tingling.         HPI:     Patient is a pleasant 59-year-old male who is returning to clinic today for recheck on his left ankle.  Patient states that he has noticed improvements in his left ankle.  He does state that he has been to a few physical therapy sessions, but does admit to not going to many recently due to the holidays.  Patient states that his left ankle has improved and has mild, intermittent pains depending on activity.  He does state that he can only walk 2-3 blocks before he has to take a break.  He rates that pain in his ankle 1/10.  Denies recent injuries.  Patient also shares that he is experiencing back issues now.  He does have a referral for chiropractor and has not yet made that appointment.  Patient has been utilizing supportive new balance tennis shoes and does have over-the-counter Spenco inserts.  He is inquiring about custom orthotics due to his ongoing bilateral foot structure.  No other concerns at this time.      Allergies: Patient has no known allergies.   Current Outpatient Medications   Medication Sig Dispense Refill    azaTHIOprine 50 MG Oral Tab Take 3.5 tablets (175 mg total) by mouth daily.      Omeprazole 40 MG Oral Capsule Delayed Release Take 1 capsule (40 mg total) by mouth daily.      STELARA 90 MG/ML Subcutaneous Solution Prefilled Syringe injection INJECT 90 MG UNDER THE SKIN EVERY 4 WEEKS (DOSE INCREASE)      rosuvastatin 5 MG Oral Tab Take 1 tablet (5 mg total) by mouth daily.      OZEMPIC, 1 MG/DOSE, 4 MG/3ML Subcutaneous Solution Pen-injector Inject 1 mg into the skin every 7 days.      colestipol 1 g Oral Tab Take  3 tablets (3 g total) by mouth 2 (two) times daily.        Past Medical History:    Crohn disease (HCC)      No past surgical history on file.   No family history on file.   Social History     Socioeconomic History    Marital status:    Tobacco Use    Smoking status: Never    Smokeless tobacco: Never           REVIEW OF SYSTEMS:     10 point ROS completed and was negative unless stated in HPI.      EXAM:     Left lower extremity focused exam:  GENERAL: well developed, well nourished, in no apparent distress  EXTREMITIES:  1. Integument: Skin appears moist, warm, and supple with positive hair growth.  Some bruising noted to the ankle. No open lesions. No macerations. No Hyperkeratotic lesions.   2. Vascular: Pedal pulses palpable, capillary refill normal.  No longer edema noted to left lower extremity  3. Neurological: Gross sensation intact via light touch bilaterally.  Normal sharp/dull sensation  4. Musculoskeletal: No pain with palpation to both lateral and medial ankle.  Patient is able to actively move his toes and slightly move his ankle joint with some restriction.  Negative anterior drawer.  Compartments of the foot are soft and compressible.  5/5 muscle strength all tendons crossing the ankle joint with some pain elicited.  Overall increase in medial longitudinal arch noted to bilateral feet.  Upon standing, patient does have a rear foot varus with increase in arch height consistent with a cavus foot structure.  Patient's shoes do appear to be worn out on the lateral aspect bilaterally.       ASSESSMENT AND PLAN:   Diagnoses and all orders for this visit:    Injury of left ankle, subsequent encounter    Moderate left ankle sprain, subsequent encounter    Peroneal tendinitis of right lower extremity    Cavus deformity of both feet    Acquired left rearfoot varus    Acquired rearfoot varus, right    High arch    Acute left ankle pain        Plan:   -Patient was seen and evaluated today in clinic.   Chart history reviewed.    Clinically, patient has improved with no pain elicited on exam today.  Patient does subjectively continue to have some pain.    Recommending patient continue and finish out his physical therapy.  Also recommend that he perform these exercises on a daily basis at home.    Due to patient's cavus foot structure bilaterally, discussed the importance of overall support at all times.  I do believe custom orthotics are medically necessary due to patient's ongoing foot deformities.  Patient was provided with information to obtain preauthorization for custom orthotics.  Pending this, patient will follow-up to be scanned for custom orthotics    Recommending patient rest, ice, and elevate it with any increasing pain, as well as avoiding any activities that do cause patient pain.    -All of the patient's questions and concerns were addressed.  They indicated their understanding of these issues and agrees to the plan.    Time spent reviewing pertinent information from patient's chart, reviewing any pertinent imaging, obtaining history and physical exam, discussing and mutually agreeing on a treatment plan, and documenting encounter: 25 minutes    RTC if wanting to move forward with custom orthotic scanning    Alpesh Santos DPM        1/10/2025    [facility]  [id number] [place] [id number]   [email or web address]            [email or web address] speech recognition software was used to prepare this note.  Errors in word recognition may occur.  Please contact me with any questions/concerns with this note.

## 2025-02-04 ENCOUNTER — OFFICE VISIT (OUTPATIENT)
Facility: LOCATION | Age: 60
End: 2025-02-04
Payer: COMMERCIAL

## 2025-02-04 DIAGNOSIS — M21.6X1 ACQUIRED REARFOOT VARUS, RIGHT: ICD-10-CM

## 2025-02-04 DIAGNOSIS — S99.912D INJURY OF LEFT ANKLE, SUBSEQUENT ENCOUNTER: Primary | ICD-10-CM

## 2025-02-04 DIAGNOSIS — S93.402D MODERATE LEFT ANKLE SPRAIN, SUBSEQUENT ENCOUNTER: ICD-10-CM

## 2025-02-04 DIAGNOSIS — Q66.71 CAVUS DEFORMITY OF BOTH FEET: ICD-10-CM

## 2025-02-04 DIAGNOSIS — M76.822 POSTERIOR TIBIAL TENDINITIS OF LEFT LEG: ICD-10-CM

## 2025-02-04 DIAGNOSIS — Q66.72 CAVUS DEFORMITY OF BOTH FEET: ICD-10-CM

## 2025-02-04 DIAGNOSIS — Q66.70 HIGH ARCH: ICD-10-CM

## 2025-02-04 DIAGNOSIS — M21.6X2 ACQUIRED LEFT REARFOOT VARUS: ICD-10-CM

## 2025-02-04 DIAGNOSIS — M76.71 PERONEAL TENDINITIS OF RIGHT LOWER EXTREMITY: ICD-10-CM

## 2025-02-04 PROCEDURE — L3000 FT INSERT UCB BERKELEY SHELL: HCPCS | Performed by: PODIATRIST

## 2025-02-04 PROCEDURE — 99212 OFFICE O/P EST SF 10 MIN: CPT | Performed by: PODIATRIST

## 2025-02-04 NOTE — PROGRESS NOTES
[facility]  Progress Note      Nick Acevedo is a 60 year old male.   Chief Complaint   Patient presents with    Orthotic Status     Patient is here to be scanned for custom orthotics. He has spoken with his insurance and agrees to move forward. Denies any pain in the office today.         HPI:     Patient is a pleasant 60-year-old male who is returning to clinic today for orthotic scanning.  Patient states that he checked with insurance and agrees to move forward with custom orthotic scanning.  Overall denying any significant pain to bilateral feet today.  He does have a history of ankle sprains due to his cavus foot structures.  No further concerns at this time.      Allergies: Patient has no known allergies.   Current Outpatient Medications   Medication Sig Dispense Refill    azaTHIOprine 50 MG Oral Tab Take 3.5 tablets (175 mg total) by mouth daily.      Omeprazole 40 MG Oral Capsule Delayed Release Take 1 capsule (40 mg total) by mouth daily.      STELARA 90 MG/ML Subcutaneous Solution Prefilled Syringe injection INJECT 90 MG UNDER THE SKIN EVERY 4 WEEKS (DOSE INCREASE)      rosuvastatin 5 MG Oral Tab Take 1 tablet (5 mg total) by mouth daily.      OZEMPIC, 1 MG/DOSE, 4 MG/3ML Subcutaneous Solution Pen-injector Inject 1 mg into the skin every 7 days.      colestipol 1 g Oral Tab Take 3 tablets (3 g total) by mouth 2 (two) times daily.        Past Medical History:    Crohn disease (HCC)      No past surgical history on file.   No family history on file.   Social History     Socioeconomic History    Marital status:    Tobacco Use    Smoking status: Never    Smokeless tobacco: Never           REVIEW OF SYSTEMS:     10 point ROS completed and was negative unless stated in HPI.      EXAM:     GENERAL: well developed, well nourished, in no apparent distress  EXTREMITIES:  1. Integument: Skin appears moist, warm, and supple with positive hair growth.  Some bruising noted to the ankle. No open  lesions. No macerations. No Hyperkeratotic lesions.   2. Vascular: Pedal pulses tough to palpate due to significant edema, capillary refill normal.  Edema noted to left lower extremity  3. Neurological: Gross sensation intact via light touch bilaterally.  Normal sharp/dull sensation  4. Musculoskeletal: No pain with palpation to both lateral and medial ankle, particularly overlying ligament complexes, which has overall improved.  Patient is able to actively move his toes and slightly move his ankle joint with some restriction.  Negative anterior drawer.  Compartments of the foot are soft and compressible.  4+/5 muscle strength all tendons crossing the ankle joint with some pain elicited.  Overall increased medial longitudinal arch is noted bilateral feet with overall varus heel when standing.       ASSESSMENT AND PLAN:   Diagnoses and all orders for this visit:    Injury of left ankle, subsequent encounter    Moderate left ankle sprain, subsequent encounter    Peroneal tendinitis of right lower extremity    Cavus deformity of both feet    Acquired left rearfoot varus    Acquired rearfoot varus, right    High arch    Posterior tibial tendinitis of left leg        Plan:   -Patient was seen and evaluated today in clinic.  Chart history reviewed.    Patient has signed the appropriate custom orthotic waiver form.  Biomechanical and range of motion measurements taken on this date. The patient is scanned for custom orthotics in a neutral position using electronic scanner and positioner per  protocol.  The patient will be notified when orthotics arrive. The patient is to continue use of temporary inserts, ice areas as instructed and continue with anti-inflammatories as needed.  -Ap Custom Orthotic  -Ge Custom Orthotic      -All of the patient's questions and concerns were addressed.  They indicated their understanding of these issues and agrees to the plan.      RTC once orthotics have arrived    Alpesh  DARIUSZ Santos, AACFAS        2/4/2025    [facility]  [address] [place] [id number]   [email or web address]            [employer] speech recognition software was used to prepare this note.  Errors in word recognition may occur.  Please contact me with any questions/concerns with this note.

## 2025-02-17 ENCOUNTER — APPOINTMENT (OUTPATIENT)
Dept: URBAN - METROPOLITAN AREA CLINIC 247 | Age: 60
Setting detail: DERMATOLOGY
End: 2025-02-17

## 2025-02-17 DIAGNOSIS — B07.8 OTHER VIRAL WARTS: ICD-10-CM

## 2025-02-17 DIAGNOSIS — D22 MELANOCYTIC NEVI: ICD-10-CM

## 2025-02-17 DIAGNOSIS — D18.0 HEMANGIOMA: ICD-10-CM

## 2025-02-17 DIAGNOSIS — L57.8 OTHER SKIN CHANGES DUE TO CHRONIC EXPOSURE TO NONIONIZING RADIATION: ICD-10-CM

## 2025-02-17 DIAGNOSIS — L91.8 OTHER HYPERTROPHIC DISORDERS OF THE SKIN: ICD-10-CM

## 2025-02-17 DIAGNOSIS — L82.1 OTHER SEBORRHEIC KERATOSIS: ICD-10-CM

## 2025-02-17 PROBLEM — D22.61 MELANOCYTIC NEVI OF RIGHT UPPER LIMB, INCLUDING SHOULDER: Status: ACTIVE | Noted: 2025-02-17

## 2025-02-17 PROBLEM — D23.71 OTHER BENIGN NEOPLASM OF SKIN OF RIGHT LOWER LIMB, INCLUDING HIP: Status: ACTIVE | Noted: 2025-02-17

## 2025-02-17 PROBLEM — D18.01 HEMANGIOMA OF SKIN AND SUBCUTANEOUS TISSUE: Status: ACTIVE | Noted: 2025-02-17

## 2025-02-17 PROCEDURE — OTHER LIQUID NITROGEN: OTHER

## 2025-02-17 PROCEDURE — OTHER COUNSELING: OTHER

## 2025-02-17 PROCEDURE — 99213 OFFICE O/P EST LOW 20 MIN: CPT | Mod: 25

## 2025-02-17 PROCEDURE — 17110 DESTRUCT B9 LESION 1-14: CPT

## 2025-02-17 PROCEDURE — OTHER MIPS QUALITY: OTHER

## 2025-02-17 ASSESSMENT — LOCATION ZONE DERM
LOCATION ZONE: ARM
LOCATION ZONE: FACE
LOCATION ZONE: NECK
LOCATION ZONE: TRUNK
LOCATION ZONE: HAND

## 2025-02-17 ASSESSMENT — LOCATION DETAILED DESCRIPTION DERM
LOCATION DETAILED: RIGHT SUPERIOR LATERAL MALAR CHEEK
LOCATION DETAILED: RIGHT INFERIOR ANTERIOR NECK
LOCATION DETAILED: RIGHT INFERIOR FOREHEAD
LOCATION DETAILED: LEFT INFERIOR LATERAL FOREHEAD
LOCATION DETAILED: LEFT DORSAL MIDDLE METACARPOPHALANGEAL JOINT
LOCATION DETAILED: RIGHT LATERAL ABDOMEN
LOCATION DETAILED: LEFT SUPERIOR LATERAL NECK
LOCATION DETAILED: RIGHT MID-UPPER BACK
LOCATION DETAILED: LEFT INFERIOR CENTRAL MALAR CHEEK
LOCATION DETAILED: RIGHT INFERIOR TEMPLE
LOCATION DETAILED: RIGHT SUPERIOR LATERAL MIDBACK
LOCATION DETAILED: RIGHT RADIAL DORSAL HAND
LOCATION DETAILED: RIGHT PROXIMAL POSTERIOR UPPER ARM
LOCATION DETAILED: LEFT ULNAR DORSAL HAND

## 2025-02-17 ASSESSMENT — LOCATION SIMPLE DESCRIPTION DERM
LOCATION SIMPLE: LEFT CHEEK
LOCATION SIMPLE: RIGHT TEMPLE
LOCATION SIMPLE: RIGHT UPPER BACK
LOCATION SIMPLE: RIGHT CHEEK
LOCATION SIMPLE: RIGHT UPPER ARM
LOCATION SIMPLE: LEFT FOREHEAD
LOCATION SIMPLE: RIGHT FOREHEAD
LOCATION SIMPLE: RIGHT ANTERIOR NECK
LOCATION SIMPLE: RIGHT HAND
LOCATION SIMPLE: RIGHT LOWER BACK
LOCATION SIMPLE: ABDOMEN
LOCATION SIMPLE: NECK
LOCATION SIMPLE: LEFT HAND

## 2025-03-04 ENCOUNTER — TELEPHONE (OUTPATIENT)
Facility: LOCATION | Age: 60
End: 2025-03-04

## 2025-03-04 NOTE — TELEPHONE ENCOUNTER
Called patient and left message on machine that orthotics are not in yet. Can take up to 6 weeks and will notify him once they arrive. Call back if any further concerns.

## 2025-03-04 NOTE — TELEPHONE ENCOUNTER
Patient was seen on 2/4/25 and was measured/fitted for his orthotics. Patient calling for update, please call at [phone],thanks.

## 2025-03-20 NOTE — TELEPHONE ENCOUNTER
Problem: At Risk for Falls  Goal: Patient does not fall  Outcome: Monitoring/Evaluating progress  Goal: Patient takes action to control fall-related risks  Outcome: Monitoring/Evaluating progress     Problem: At Risk for Injury Due to Fall  Goal: Patient does not fall  Outcome: Monitoring/Evaluating progress  Goal: Takes action to control condition specific risks  Outcome: Monitoring/Evaluating progress  Goal: Verbalizes understanding of fall-related injury personal risks  Description: Document education using the patient education activity  Outcome: Monitoring/Evaluating progress     Problem: Pain  Goal: Acceptable pain level achieved/maintained at rest using appropriate pain scale for the patient  Outcome: Monitoring/Evaluating progress  Goal: Acceptable pain level achieved/maintained with activity using appropriate pain scale for the patient  Outcome: Monitoring/Evaluating progress  Goal: Acceptable pain level achieved/maintained without oversedation  Outcome: Monitoring/Evaluating progress      Spoke with patient. He accepted appointment on 9/13 at 2:30 pm, location and directions given.

## 2025-06-19 ENCOUNTER — OFFICE VISIT (OUTPATIENT)
Dept: NEUROLOGY | Facility: CLINIC | Age: 60
End: 2025-06-19
Payer: COMMERCIAL

## 2025-06-19 VITALS
TEMPERATURE: 97 F | HEIGHT: 72 IN | RESPIRATION RATE: 18 BRPM | WEIGHT: 280 LBS | HEART RATE: 78 BPM | SYSTOLIC BLOOD PRESSURE: 132 MMHG | DIASTOLIC BLOOD PRESSURE: 84 MMHG | BODY MASS INDEX: 37.93 KG/M2 | OXYGEN SATURATION: 94 %

## 2025-06-19 DIAGNOSIS — R25.1 TREMOR: ICD-10-CM

## 2025-06-19 DIAGNOSIS — R29.818 PARKINSONIAN FEATURES: Primary | ICD-10-CM

## 2025-06-19 PROCEDURE — 3079F DIAST BP 80-89 MM HG: CPT | Performed by: OTHER

## 2025-06-19 PROCEDURE — 3008F BODY MASS INDEX DOCD: CPT | Performed by: OTHER

## 2025-06-19 PROCEDURE — 99204 OFFICE O/P NEW MOD 45 MIN: CPT | Performed by: OTHER

## 2025-06-19 PROCEDURE — 3075F SYST BP GE 130 - 139MM HG: CPT | Performed by: OTHER

## 2025-06-19 RX ORDER — TIRZEPATIDE 2.5 MG/.5ML
2.5 INJECTION, SOLUTION SUBCUTANEOUS
COMMUNITY
Start: 2025-04-01

## 2025-06-19 NOTE — PATIENT INSTRUCTIONS
Refill policies:    Allow 2-3 business days for refills; controlled substances may take longer.  Contact your pharmacy at least 5 days prior to running out of medication and have them send an electronic request or submit request through the “request refill” option in your [employer] [facility] account.  Refills are not addressed on weekends; covering physicians do not authorize routine medications on weekends.  No narcotics or controlled substances are refilled after noon on Fridays or by on call physicians.  By law, narcotics must be electronically prescribed.  A 30 day supply with no refills is the maximum allowed.  If your prescription is due for a refill, you may be due for a follow up appointment.  To best provide you care, patients receiving routine medications need to be seen at least once a year.  Patients receiving narcotic/controlled substance medications need to be seen at least once every 3 months.  In the event that your preferred pharmacy does not have the requested medication in stock (e.g. Backordered), it is your responsibility to find another pharmacy that has the requested medication available.  We will gladly send a new prescription to that pharmacy at your request.    Scheduling Tests:    If your physician has ordered radiology tests such as MRI or CT scans, please contact Central Scheduling at [phone] right away to schedule the test.  Once scheduled, the [facility] Centralized Referral Team will work with your insurance carrier to obtain pre-certification or prior authorization.  Depending on your insurance carrier, approval may take 3-10 days.  It is highly recommended patients assure they have received an authorization before having a test performed.  If test is done without insurance authorization, patient may be responsible for the entire amount billed.      Precertification and Prior Authorizations:  If your physician has recommended that you have a procedure or additional testing performed the [facility]  Centralized Referral Team will contact your insurance carrier to obtain pre-certification or prior authorization.    You are strongly encouraged to contact your insurance carrier to verify that your procedure/test has been approved and is a COVERED benefit.  Although the [facility] Centralized Referral Team does its due diligence, the insurance carrier gives the disclaimer that \"Although the procedure is authorized, this does not guarantee payment.\"    Ultimately the patient is responsible for payment.   Thank you for your understanding in this matter.  Paperwork Completion:  If you require FMLA or disability paperwork for your recovery, please make sure to either drop it off or have it faxed to our office at [phone]. Be sure the form has your name and date of birth on it.  The form will be faxed to our Forms Department and they will complete it for you.  There is a 25$ fee for all forms that need to be filled out.  Please be aware there is a 10-14 day turnaround time.  You will need to sign a release of information (JANIA) form if your paperwork does not come with one.  You may call the Forms Department with any questions at [phone].  Their fax number is [phone].

## 2025-06-19 NOTE — H&P
[facility] New Patient / Consult Visit    Nick Acevedo is a 60 year old male.                         Referring MD: No ref. provider found    Chief Complaint   Patient presents with    Neurologic Problem     New patient referred by PCP right hand tremor x 2 months        HPI:    Nick Acevedo is a 60 year old, who presents for evaluation of tremor in the right hand.     Patient has previously been seen by movement disorders specialist who advised this may be Parkinson's disease but he is here for second opinion.        Patient states he first noted tremor in R hand ~ 6 months ago. Initially, he noted this when he would stretch his arm above his shoulder and then noted this would occur on its own at rest; no symptoms in left hand; no issues when eating or drinking; no balance issues or falls.      He denies issues with buttoning buttons but notes his writing is different than in the past   Of note, he has had surgery for trigger finger in R hand (digit 3) last Thursday and has had multiple prior interventions (ie localized steroid injections) but continues to have recurrent symptoms.       Otherwise, patient denies any recent weight change, fevers, chills, nausea, double vision/ blurry vision / loss of vision, chest pain, palpitations, shortness of breath, rashes, joint pains, bowel / bladder incontinence or mood issues.     Past Medical History[1]  Past Surgical History[2]  Short Social Hx on File[3]  Family History[4]    Allergies:  Allergies[5]   Current Meds:  Current Medications[6]       ROS:   A comprehensive 10 point review of systems was completed.  Pertinent positives and negatives noted in the the HPI.      PHYSICAL EXAM:   /84   Pulse 78   Temp 97.4 °F (36.3 °C)   Resp 18   Ht 72\"   Wt 280 lb (127 kg)   SpO2 94%   BMI 37.97 kg/m²   Estimated body mass index is 37.97 kg/m² as calculated from the following:    Height as of this encounter: 72\".    Weight as of this  encounter: 280 lb (127 kg).    GENERAL: well developed, well nourished, in no apparent distress  SKIN: no rashes  EYES: sclera anicteric, conjunctiva normal  HEENT: normocephalic  CARDIOVASCULAR: S1, S2 normal, RRR  LUNGS: clear to auscultation bilaterally  EXTREMITIES: no cyanosis, peripheral pulses intact    Neck: Supple; full range of motion; no carotid bruits    Mental status:  Alert and oriented to time, place, person, and situation  Speech: fluent  Language: normal naming, repetition, and comprehension  Memory: normal  Attention/concentration: normal    Fundoscopic Exam: optic discs sharp bilaterally    Cranial Nerves: II through XII  Optic:    Pupils: equally round and reactive to light with direct and consensual responses, normal accomodation   Visual acuity: Normal              Visual fields: Normal  Oculomotor/Trochlear/Abducens:    Eye Movements: EOMI without nystagmus  Trigeminal:   Facial sensation:intact to light touch bilaterally  Facial:   Smile symmetric, eyebrow raise symmetric  Vestibulocochlear:   Hearing: normal bilaterally  Glossopharyngeal/Vagus:   Palate elevates symmetrically with midline uvula  Spinal accessory:   Shoulder Shrug: normal bilaterally   Lateral head turn: normal bilaterally  Hypoglossal:   Tongue movement: protrusion is midline with normal lateral movements    Motor System:  Strength: 5/5 throughout  Tone: normal    Tremor noted at rest R hand when walking mainly - minimally noted in R hand at rest when not walking but mostly when walking with moderate amplitude, moderate frequency   Some hypographia noted when he is writing longer sentence    Difficult to assess bradykinesia R UE due to recent trigger finger surgery- has most difficulty with hand opening /closing; mild bradykinesia with decrement of amplitude with finger taps and pronation/ supination movements    Very mild cogwheel rigidity in R hand with mirror movements     Sensory:  Pin is mildly reduced up to distal calf  both sides   Vibration is absent great toes bilaterally and R malleolus but present L malleolus  Proprioception is normal  Romberg is absent    Coordination:  Finger to nose normal bilaterally  Rapid alternating movements normal bilaterally  Heel to shin is normal bilaterally    DTRs:   2+, symmetric throughout, toes downgoing bilaterally; no clonus          Gait:  Normal casual, heel, toe gait but off balance with tandem gait    Pull test for postural instability: negative      TEST RESULTS/DATA REVIEWED:   Labs    1/27/2025  B12: 199 - low    IMPRESSION AND PLAN:   Nick Acevedo is a 60 year old male who presents for evaluation of tremor in the right hand.     Patient has previously been seen by movement disorders specialist who advised this may be Parkinson's disease but he is here for second opinion.        Patient states he first noted tremor in R hand ~ 6 months ago. Initially, he noted this when he would stretch his arm above his shoulder and then noted this would occur on its own at rest; no symptoms in left hand; no issues when eating or drinking; no balance issues or falls.      He denies issues with buttoning buttons but notes his writing is different than in the past   Of note, he has had surgery for trigger finger in R hand (digit 3) last Thursday and has had multiple prior interventions (ie localized steroid injections) but continues to have recurrent symptoms.       Neurologic exam shows tremor mainly noted when resting at his right side but he also has mild rigidity and mild bradykinesia in R UE with hypographia and decrement of amplitude with fine motor movements. Gait appears normal aside from off balance with tandem gait. Signs and symptoms are suggestive of parkinsonian type tremor but overall mild - therefore, recommend LESLYE scan to help assess / differentiate Parkinsionian syndrome vs essential tremor; will hold off on levodopa therapy for now but discussed option with patient to be  considered in the future pending workup; B12 was low in the past and recommend take 1000 mcg daily     1. Parkinsonian features  As noted above  - NM BRAIN LESLYE IOFLUPANE (SINGLE) 1 DAY (CPT=78803); Future    2. Tremor  As noted above   - NM BRAIN LESLYE IOFLUPANE (SINGLE) 1 DAY (CPT=78803); Future    Return in about 3 months (around 9/19/2025).    Copy of note was sent to PCP    Sj Perry MD, Neurology  [facility]  Pager [phone]  6/19/2025         [1]   Past Medical History:   Crohn disease (HCC)   [2] History reviewed. No pertinent surgical history.  [3]   Social History  Socioeconomic History    Marital status:    Tobacco Use    Smoking status: Never    Smokeless tobacco: Never   Substance and Sexual Activity    Alcohol use: Yes     Comment: RARELY    Drug use: Never   Other Topics Concern    Caffeine Concern No    Exercise No    Seat Belt Yes    Special Diet No    Stress Concern No    Weight Concern No     Social Drivers of Health     Food Insecurity: Low Risk  (4/10/2025)    Received from [facility]    Food Insecurity     Have there been times that your food ran out, and you didn't have money to get more?: No     Are there times that you worry that this might happen?: No   Transportation Needs: Low Risk  (4/10/2025)    Received from [facility]    Transportation Needs     Do you have trouble getting transportation to medical appointments?: No     How do you normally get to and from your appointments?: Public Transit (such as [employer], [employer], [employer])   [4]   Family History  Problem Relation Age of Onset    No Known Problems Father     Alcohol abuse Mother    [5] No Known Allergies  [6]   Current Outpatient Medications   Medication Sig Dispense Refill    MOUNJARO 2.5 MG/0.5ML Subcutaneous Solution Auto-injector Inject 2.5 mg into the skin every 7 days.      azaTHIOprine 50 MG Oral Tab Take 3.5 tablets (175 mg total) by mouth daily.       Omeprazole 40 MG Oral Capsule Delayed Release Take 1 capsule (40 mg total) by mouth daily.      rosuvastatin 5 MG Oral Tab Take 1 tablet (5 mg total) by mouth daily.      colestipol 1 g Oral Tab Take 3 tablets (3 g total) by mouth 2 (two) times daily.

## 2025-08-14 ENCOUNTER — HOSPITAL ENCOUNTER (OUTPATIENT)
Dept: NUCLEAR MEDICINE | Facility: HOSPITAL | Age: 60
Discharge: HOME | End: 2025-08-14
Attending: Other

## 2025-08-14 DIAGNOSIS — R25.1 TREMOR: ICD-10-CM

## 2025-08-14 DIAGNOSIS — R29.818 PARKINSONIAN FEATURES: ICD-10-CM

## 2025-08-14 PROCEDURE — 78803 RP LOCLZJ TUM SPECT 1 AREA: CPT | Performed by: OTHER

## (undated) NOTE — LETTER
10/11/2024          To Whom It May Concern:    Nick Acevedo is currently under my medical care and may return to work at this time.      Activity is restricted as follows: No restrictions.    If you require additional information please contact our office.        Sincerely,    Alpesh Santos DPM